# Patient Record
Sex: FEMALE | Race: WHITE | URBAN - METROPOLITAN AREA
[De-identification: names, ages, dates, MRNs, and addresses within clinical notes are randomized per-mention and may not be internally consistent; named-entity substitution may affect disease eponyms.]

---

## 2022-06-22 ENCOUNTER — OFFICE (OUTPATIENT)
Dept: URBAN - METROPOLITAN AREA CLINIC 105 | Facility: CLINIC | Age: 72
End: 2022-06-22
Payer: COMMERCIAL

## 2022-06-22 VITALS
DIASTOLIC BLOOD PRESSURE: 80 MMHG | HEIGHT: 66 IN | HEART RATE: 62 BPM | WEIGHT: 187 LBS | SYSTOLIC BLOOD PRESSURE: 130 MMHG | OXYGEN SATURATION: 99 %

## 2022-06-22 DIAGNOSIS — K21.9 GASTRO-ESOPHAGEAL REFLUX DISEASE WITHOUT ESOPHAGITIS: ICD-10-CM

## 2022-06-22 DIAGNOSIS — K52.9 NONINFECTIVE GASTROENTERITIS AND COLITIS, UNSPECIFIED: ICD-10-CM

## 2022-06-22 DIAGNOSIS — K57.30 DIVERTICULOSIS OF LARGE INTESTINE WITHOUT PERFORATION OR ABS: ICD-10-CM

## 2022-06-22 PROCEDURE — 99204 OFFICE O/P NEW MOD 45 MIN: CPT

## 2022-06-22 RX ORDER — SODIUM PICOSULFATE, MAGNESIUM OXIDE, AND ANHYDROUS CITRIC ACID 10; 3.5; 12 MG/160ML; G/160ML; G/160ML
LIQUID ORAL
Qty: 1 | Refills: 0 | Status: COMPLETED
Start: 2022-06-22 | End: 2022-07-19

## 2022-06-22 NOTE — SERVICEHPINOTES
Serenity Rodrigez   is seen for an initial visit today.       Patient previously had a colonoscopy 10/2014 with diverticulosis.  She states that she had a cholecystectomy in 2012.  She has had diarrhea off and on since this time but has been worse in the last 3 years.  She reports having constant diarrhea every time she eats.  She has good days and bad days.  She denies abdominal pain.  She denies NSAID use.  She has been  taking sertraline for many years.  She states that she started taking omeprazole approximately 6 years ago for reflux.  She states the omeprazole helps with her reflux.  She reports having upper endoscopy many years ago.  She denies blood in stool or black stool.  She reports having stools that are watery in consistency.  She does not take anything for diarrhea.

## 2022-06-22 NOTE — SERVICENOTES
-ordered upper endoscopy and colonoscopy
-ordered blood work
-ordered stool studies
-continue omeprazole daily, 30-60 minutes before breakfast
-try loperamide (Imodium) as needed for diarrhea, max dose 8 pills in 24 hours

## 2022-07-05 ENCOUNTER — OFFICE (OUTPATIENT)
Dept: URBAN - METROPOLITAN AREA PATHOLOGY 24 | Facility: PATHOLOGY | Age: 72
End: 2022-07-05
Payer: COMMERCIAL

## 2022-07-05 ENCOUNTER — AMBULATORY SURGICAL CENTER (OUTPATIENT)
Dept: URBAN - METROPOLITAN AREA SURGERY 26 | Facility: SURGERY | Age: 72
End: 2022-07-05
Payer: COMMERCIAL

## 2022-07-05 DIAGNOSIS — K64.0 FIRST DEGREE HEMORRHOIDS: ICD-10-CM

## 2022-07-05 DIAGNOSIS — K31.7 POLYP OF STOMACH AND DUODENUM: ICD-10-CM

## 2022-07-05 DIAGNOSIS — K29.50 UNSPECIFIED CHRONIC GASTRITIS WITHOUT BLEEDING: ICD-10-CM

## 2022-07-05 DIAGNOSIS — K52.9 NONINFECTIVE GASTROENTERITIS AND COLITIS, UNSPECIFIED: ICD-10-CM

## 2022-07-05 DIAGNOSIS — K21.9 GASTRO-ESOPHAGEAL REFLUX DISEASE WITHOUT ESOPHAGITIS: ICD-10-CM

## 2022-07-05 DIAGNOSIS — K57.30 DIVERTICULOSIS OF LARGE INTESTINE WITHOUT PERFORATION OR ABS: ICD-10-CM

## 2022-07-05 DIAGNOSIS — K44.9 DIAPHRAGMATIC HERNIA WITHOUT OBSTRUCTION OR GANGRENE: ICD-10-CM

## 2022-07-05 DIAGNOSIS — R19.7 DIARRHEA, UNSPECIFIED: ICD-10-CM

## 2022-07-05 PROCEDURE — 88313 SPECIAL STAINS GROUP 2: CPT | Performed by: PATHOLOGY

## 2022-07-05 PROCEDURE — 88342 IMHCHEM/IMCYTCHM 1ST ANTB: CPT | Performed by: PATHOLOGY

## 2022-07-05 PROCEDURE — 88305 TISSUE EXAM BY PATHOLOGIST: CPT | Performed by: PATHOLOGY

## 2022-07-05 PROCEDURE — 45380 COLONOSCOPY AND BIOPSY: CPT

## 2022-07-05 PROCEDURE — 43239 EGD BIOPSY SINGLE/MULTIPLE: CPT

## 2022-07-20 ENCOUNTER — OFFICE (OUTPATIENT)
Dept: URBAN - METROPOLITAN AREA CLINIC 105 | Facility: CLINIC | Age: 72
End: 2022-07-20
Payer: COMMERCIAL

## 2022-07-20 VITALS
WEIGHT: 187 LBS | HEIGHT: 66 IN | DIASTOLIC BLOOD PRESSURE: 70 MMHG | OXYGEN SATURATION: 97 % | SYSTOLIC BLOOD PRESSURE: 128 MMHG | HEART RATE: 59 BPM

## 2022-07-20 DIAGNOSIS — K52.9 NONINFECTIVE GASTROENTERITIS AND COLITIS, UNSPECIFIED: ICD-10-CM

## 2022-07-20 DIAGNOSIS — K64.9 UNSPECIFIED HEMORRHOIDS: ICD-10-CM

## 2022-07-20 DIAGNOSIS — K44.9 DIAPHRAGMATIC HERNIA WITHOUT OBSTRUCTION OR GANGRENE: ICD-10-CM

## 2022-07-20 DIAGNOSIS — K58.0 IRRITABLE BOWEL SYNDROME WITH DIARRHEA: ICD-10-CM

## 2022-07-20 DIAGNOSIS — K57.30 DIVERTICULOSIS OF LARGE INTESTINE WITHOUT PERFORATION OR ABS: ICD-10-CM

## 2022-07-20 DIAGNOSIS — K52.89 OTHER SPECIFIED NONINFECTIVE GASTROENTERITIS AND COLITIS: ICD-10-CM

## 2022-07-20 DIAGNOSIS — K21.9 GASTRO-ESOPHAGEAL REFLUX DISEASE WITHOUT ESOPHAGITIS: ICD-10-CM

## 2022-07-20 PROCEDURE — 99213 OFFICE O/P EST LOW 20 MIN: CPT

## 2022-07-20 RX ORDER — RIFAXIMIN 550 MG/1
1650 TABLET ORAL
Qty: 42 | Refills: 0 | Status: COMPLETED
Start: 2022-07-20 | End: 2022-12-27

## 2022-07-20 NOTE — SERVICEHPINOTES
Serenity Rodrigez   is seen today for a follow-up visit.    71-year-old woman who presents for follow-up of chronic diarrhea.   Patient was seen in clinic 6/22/2022 for chronic diarrhea.  She had a CMP with mild elevation in her creatinine, but this improved on repeat BMP.  CBC, CRP within normal limits and celiac panel negative.  GI pathogen panel and fecal calprotectin negative.  She had EGD 07/05/2022 with normal esophagus, 2 cm hiatal hernia, gastric polyps (benign fundic gland polyps), mild erythema in the antrum (biopsies with mild chronic gastritis, negative for H pylori or GIM) and normal duodenum (biopsies negative for celiac disease).  She had a colonoscopy that showed internal/external hemorrhoids, sigmoid diverticulosis and normal appearing colonic mucosa (biopsies with increase in mast cells, concerning for possible mastocytic enterocolitis).  Patient states that she is doing well.  Her symptoms have been off and on but continues to have diarrhea daily.  She has not tried Imodium.

## 2022-07-20 NOTE — SERVICENOTES
-sent in prescription for rifaximin 550 mg three times daily for 2 weeks; please let us know if this prescription is too expensive
-take loperamide (Imodium) as needed for diarrhea
-if you do not have improvement with rifaximin, we can consider a referral to allergist for mastocytic enterocolitis
-continue omeprazole daily for heartburn
-follow-up in 1 month

## 2022-08-19 ENCOUNTER — OFFICE (OUTPATIENT)
Dept: URBAN - METROPOLITAN AREA CLINIC 105 | Facility: CLINIC | Age: 72
End: 2022-08-19
Payer: COMMERCIAL

## 2022-08-19 VITALS
WEIGHT: 189 LBS | DIASTOLIC BLOOD PRESSURE: 70 MMHG | OXYGEN SATURATION: 97 % | SYSTOLIC BLOOD PRESSURE: 114 MMHG | HEIGHT: 66 IN | HEART RATE: 57 BPM

## 2022-08-19 DIAGNOSIS — K52.9 NONINFECTIVE GASTROENTERITIS AND COLITIS, UNSPECIFIED: ICD-10-CM

## 2022-08-19 DIAGNOSIS — K58.0 IRRITABLE BOWEL SYNDROME WITH DIARRHEA: ICD-10-CM

## 2022-08-19 DIAGNOSIS — K21.9 GASTRO-ESOPHAGEAL REFLUX DISEASE WITHOUT ESOPHAGITIS: ICD-10-CM

## 2022-08-19 PROCEDURE — 99213 OFFICE O/P EST LOW 20 MIN: CPT

## 2022-08-19 NOTE — SERVICEHPINOTES
Serenity Rodrigez   is seen today for a follow-up visit.   Patient last seen in clinic 7/20/2022 for follow-up of chronic diarrhea.  Patient was prescribed a course of rifaximin 550 mg t.i.d. x14 days.  She was also found to have an increase in mast cells, concerning for possible mastocytic enterocolitis.  She reports significant improvement in her symptoms after her course of rifaximin.  She states that her bowel habits are now normal.  She denies blood in stool or black stool.  GERD symptoms well-controlled with omeprazole.

## 2022-08-19 NOTE — SERVICENOTES
-follow-up in 6 months
-continue omeprazole daily for heartburn
-can take loperamide as needed if you have occasional diarrhea; let me know if you have worsening symptoms

## 2022-12-27 ENCOUNTER — OFFICE (OUTPATIENT)
Dept: URBAN - METROPOLITAN AREA CLINIC 105 | Facility: CLINIC | Age: 72
End: 2022-12-27
Payer: COMMERCIAL

## 2022-12-27 VITALS
OXYGEN SATURATION: 97 % | HEIGHT: 66 IN | DIASTOLIC BLOOD PRESSURE: 80 MMHG | SYSTOLIC BLOOD PRESSURE: 120 MMHG | WEIGHT: 188 LBS | HEART RATE: 72 BPM

## 2022-12-27 DIAGNOSIS — K21.9 GASTRO-ESOPHAGEAL REFLUX DISEASE WITHOUT ESOPHAGITIS: ICD-10-CM

## 2022-12-27 DIAGNOSIS — K52.89 OTHER SPECIFIED NONINFECTIVE GASTROENTERITIS AND COLITIS: ICD-10-CM

## 2022-12-27 DIAGNOSIS — K58.0 IRRITABLE BOWEL SYNDROME WITH DIARRHEA: ICD-10-CM

## 2022-12-27 DIAGNOSIS — K52.9 NONINFECTIVE GASTROENTERITIS AND COLITIS, UNSPECIFIED: ICD-10-CM

## 2022-12-27 PROCEDURE — 99213 OFFICE O/P EST LOW 20 MIN: CPT

## 2022-12-27 RX ORDER — RIFAXIMIN 550 MG/1
1650 TABLET ORAL
Qty: 42 | Refills: 0 | Status: COMPLETED
Start: 2022-12-27 | End: 2023-05-31

## 2022-12-27 NOTE — SERVICEHPINOTES
Serenity Rodrigez   is seen today for a follow-up visit. Patient presents for follow-up of GERD, chronic diarrhea and irritable bowel syndrome with diarrhea. Patient was seen in clinic 8/19/2022. At that time she had reported an improvement in her symptoms with rifaximin. She was previously found to have increased number of mast cells (~11-28 mast cells per HPF) in her colon that was concerning for mastocytic enterocolitis. Her GERD symptoms were well-controlled with omeprazole. Patient was advised to follow-up in 6 months and take loperamide as needed for occasional diarrhea.   
br
brPatient reports recurrence of her diarrhea. She is having diarrhea every day, more than 5 episodes per day. Stools described as loose and watery. She states her symptoms recurred 2 to 3 months after she completed her last course of rifaximin. She denies flushing or rashes. She has seasonal allergies which she occasionally takes Zyrtec. She is not currently taking anything for diarrhea. She denies blood in stool, black stool or abdominal pain. She has previously had a cholecystectomy.

## 2022-12-27 NOTE — SERVICENOTES
– Take rifaximin 550 mg 3 times daily for 14 days
– Placed referral to allergist–Dr. Fahrenholz for evaluation of mastocytic enterocolitis
– Take loperamide (Imodium) as needed for diarrhea, no more than 8 pills in 24 hours
– Continue omeprazole 20 mg daily, 30 to 60 minutes before breakfast for reflux

## 2023-02-09 ENCOUNTER — OFFICE (OUTPATIENT)
Dept: URBAN - METROPOLITAN AREA CLINIC 105 | Facility: CLINIC | Age: 73
End: 2023-02-09
Payer: COMMERCIAL

## 2023-02-09 VITALS
WEIGHT: 185 LBS | SYSTOLIC BLOOD PRESSURE: 109 MMHG | HEART RATE: 60 BPM | OXYGEN SATURATION: 98 % | DIASTOLIC BLOOD PRESSURE: 68 MMHG | HEIGHT: 66 IN

## 2023-02-09 DIAGNOSIS — K52.89 OTHER SPECIFIED NONINFECTIVE GASTROENTERITIS AND COLITIS: ICD-10-CM

## 2023-02-09 DIAGNOSIS — K21.9 GASTRO-ESOPHAGEAL REFLUX DISEASE WITHOUT ESOPHAGITIS: ICD-10-CM

## 2023-02-09 DIAGNOSIS — K58.0 IRRITABLE BOWEL SYNDROME WITH DIARRHEA: ICD-10-CM

## 2023-02-09 PROCEDURE — 99213 OFFICE O/P EST LOW 20 MIN: CPT

## 2023-02-09 NOTE — SERVICENOTES
– Ordered blood test
– Let us know if you have recurrence of your symptoms and we can consider another course of rifaximin
– Continue omeprazole 20 mg daily for reflux
– Keep your appointment with allergy–Dr. Fahrenholz for 3/23/2023
– Follow-up in 3 months

## 2023-02-09 NOTE — SERVICEHPINOTES
Serenity Rodrigez   is seen today for a follow-up visit. 
Patient presents for follow-up of GERD, chronic diarrhea, irritable bowel syndrome with diarrhea and mastocytic enterocolitis. Patient last seen in clinic 12/27/2022. Patient had reported recurrence of her diarrhea at that time with more than 5 episodes per day. Her stools were described as loose and watery. Her symptoms recurred 2 to 3 months after she completed her last course of rifaximin. She has a history of seasonal allergies for which she occasionally takes Zyrtec. She has previously had a cholecystectomy. Patient was given another course of rifaximin. She was also referred to allergist–Dr. Fahrenholz for evaluation of possible mastocytic enterocolitis. She was advised to take loperamide as needed for diarrhea. She was advised to continue her omeprazole 20 mg daily for GERD. Her case was discussed with Dr. Fahrenholz and he recommended checking serum tryptase. She is scheduled with an appointment for with him 3/23/2023.
br
brPatient is a 72-year-old woman follow-up of GERD, chronic diarrhea, irritable bowel syndrome with diarrhea and mastocytic enterocolitis accompanied with . Patient last seen in clinic 12/27/2022 and is feeling better since has been last seen here.brPt admits that her second course of rifaximin has helped her symptoms. Pt reports her stools were more on the looser side before taking antibiotics but now is in the more normal range range (Letcher 2). Her bowel habits are good most of the time and uses bathroom couple of times a day. brShe was referred to allergist–Dr. Fahrenholz for evaluation of possible mastocytic enterocolitis. Pt is scheduled with an appointment for with him 3/23/2023.brPt states she had bunion surgery 2 weeks ago by orthopedic surgeon. Also had bunion surgery in 2022.brShe has reflux symptoms which is well controlled on omeprazole.brShe denies hospitalizations or change in medical history since last seen.

## 2023-02-18 LAB — TRYPTASE: 8.3 UG/L (ref 2.2–13.2)

## 2023-05-31 ENCOUNTER — OFFICE (OUTPATIENT)
Dept: URBAN - METROPOLITAN AREA CLINIC 105 | Facility: CLINIC | Age: 73
End: 2023-05-31
Payer: COMMERCIAL

## 2023-05-31 VITALS
WEIGHT: 188 LBS | HEIGHT: 66 IN | HEART RATE: 54 BPM | SYSTOLIC BLOOD PRESSURE: 140 MMHG | OXYGEN SATURATION: 95 % | DIASTOLIC BLOOD PRESSURE: 86 MMHG

## 2023-05-31 DIAGNOSIS — K90.89 OTHER INTESTINAL MALABSORPTION: ICD-10-CM

## 2023-05-31 DIAGNOSIS — K58.0 IRRITABLE BOWEL SYNDROME WITH DIARRHEA: ICD-10-CM

## 2023-05-31 DIAGNOSIS — K52.89 OTHER SPECIFIED NONINFECTIVE GASTROENTERITIS AND COLITIS: ICD-10-CM

## 2023-05-31 DIAGNOSIS — K21.9 GASTRO-ESOPHAGEAL REFLUX DISEASE WITHOUT ESOPHAGITIS: ICD-10-CM

## 2023-05-31 PROCEDURE — 99213 OFFICE O/P EST LOW 20 MIN: CPT

## 2023-05-31 RX ORDER — COLESTIPOL HYDROCHLORIDE 1 G/1
TABLET, FILM COATED ORAL
Qty: 60 | Refills: 2 | Status: ACTIVE
Start: 2023-05-31

## 2023-05-31 NOTE — SERVICEHPINOTES
Serenity  Rodrigez   is seen today for a follow-up visit. 
br
brPatient is a 72-year-old woman who presents today for follow-up of irritable bowel syndrome with diarrhea and GERD. brShe reports her symptoms are about the same. She has 2-3 bowel movements per day on the looser side. She has tried rifaximin with mild benefit. She has reflux symptoms, which is well controlled with omeprazole. She was seen by Dr. Fahrenholz–allergist on 03/23/2023 for evaluation for mastocytic enterocolitis. She did not try famotidine.brDenies abdominal pain. Denies blood in stool.
br She was diagnosed recently with early dementia and was started on memantine.br 
brPatient presents for follow-up of irritable bowel syndrome with diarrhea and GERD. Patient last seen in clinic 2/9/2023. At that time she had reported improvement after her second course of rifaximin. Stools were more normal at that time. Reflux symptoms are well controlled on omeprazole. Patient was seen by Dr. Fahrenholz–allergist 3/23/2023 for evaluation for mast ascitic enterocolitis. Tryptase was normal. There was low suspicion for systemic mastocytosis. Patient was advised to trial famotidine 40 mg twice daily.

## 2023-05-31 NOTE — SERVICENOTES
-Sent in prescription for colestipol twice daily, to take 2 hours before or 2 hours after other medications.     
-Continue omeprazole 20 mg for acid reflux.
-Follow-up in 3 months

## 2023-06-12 ENCOUNTER — OFFICE (OUTPATIENT)
Dept: URBAN - METROPOLITAN AREA CLINIC 105 | Facility: CLINIC | Age: 73
End: 2023-06-12
Payer: MEDICARE

## 2023-06-12 VITALS
WEIGHT: 186 LBS | DIASTOLIC BLOOD PRESSURE: 80 MMHG | HEART RATE: 59 BPM | SYSTOLIC BLOOD PRESSURE: 115 MMHG | HEIGHT: 69 IN

## 2023-06-12 DIAGNOSIS — K21.9 GASTRO-ESOPHAGEAL REFLUX DISEASE WITHOUT ESOPHAGITIS: ICD-10-CM

## 2023-06-12 DIAGNOSIS — K52.89 OTHER SPECIFIED NONINFECTIVE GASTROENTERITIS AND COLITIS: ICD-10-CM

## 2023-06-12 DIAGNOSIS — K90.89 OTHER INTESTINAL MALABSORPTION: ICD-10-CM

## 2023-06-12 DIAGNOSIS — K58.0 IRRITABLE BOWEL SYNDROME WITH DIARRHEA: ICD-10-CM

## 2023-06-12 PROCEDURE — 99214 OFFICE O/P EST MOD 30 MIN: CPT

## 2023-06-12 NOTE — SERVICENOTES
Patient here with daughter to follow-up on emergency room visit, rectosigmoid moderate stool burden likely caused from the colestipol.  We will start her on over-the-counter Citrucel powder first to see if this helps bulk up her stool.  Can add on flaxseed powder 1 to 2 tablespoons daily.  If the above is not helpful then we can try one half Imodium in the morning time and can repeat or increase to full tablet as needed.  Watch for constipation.  RectiCare samples given.  If symptoms persist she will let us know. May have had a fissure, If rectal pain does not continuously improve she will call us for nifedipine cream.

## 2023-06-12 NOTE — SERVICEHPINOTES
Serenity Rodrigez   is seen today for a follow-up visit.  72-year-old female who presents for follow-up of irritable bowel syndrome with diarrhea and GERD. Patient last seen in clinic 2/9/2023. At that time she had reported improvement after her second course of rifaximin. Stools were more normal at that time. Reflux symptoms are well controlled on omeprazole. Patient was seen by Dr. Fahrenholz–allergist 3/23/2023 for evaluation for mast ascitic enterocolitis. Tryptase was normal. There was low suspicion for systemic mastocytosis. Patient was advised to trial famotidine 40 mg twice daily but she did not try this.Patient has previously had a cholecystectomy. She has received 2 courses of rifaximin but did not notice significant improvement in her symptoms after her last course.CBC, CRP within normal limits and celiac panel negative. GI pathogen panel and fecal calprotectin negative. She had EGD 7/05/2022 with normal esophagus, 2 cm hiatal hernia, gastric polyps (benign fundic gland polyps), mild erythema in the antrum (biopsies with mild chronic gastritis, negative for H pylori or GIM) and normal duodenum (biopsies negative for celiac disease). She had a colonoscopy that showed internal/external hemorrhoids, sigmoid diverticulosis and normal appearing colonic mucosa (biopsies with increase in mast cells, ~11-28 mast cells per HPF, concerning for possible mastocytic enterocolitis).Suspect that patient's symptoms are secondary to irritable bowel syndrome with diarrhea. Mastocytic enterocolitis may be contributing as well but there is low suspicion for systemic mastocytosis. Sent in prescription for colestipol 1 g twice daily as needed for diarrhea and patient was advised to take 2 hours before or 2 hours after other medications to prevent issues with absorption. This will treat potential bile acid diarrhea. We will arrange for follow-up in 3 months. If symptoms persist, can consider a trial of famotidine or cromolyn.Repeat colonoscopy in 10 years for colon cancer screening, health permitting (7/2032).Patient advised to continue omeprazole 20 mg daily for GERDSince last visit, patient was seen in the emergency room on 6-5-2023 for hard stools, bright and dark red blood in the toilet. Rectal pain. CT scan of the abdomen and pelvis showed moderate amount of stool in the rectosigmoid colon. White blood cell count 10.2, hemoglobin 12.0, MCV 92. BMP unremarkable. Suspect that the colestipol did cause her to become constipated. She was advised to have enemas completed and MiraLAX and hold the colestipol.    She stopped the colestipol and has been having the return of loose watery stools. Typically 2 to 4/day with sometimes urgency. No nighttime diarrhea. She did try Imodium in the past "I hate it ". She does not recall if it helped or why she does not like it. Her rectal pain is improving but still present.